# Patient Record
(demographics unavailable — no encounter records)

---

## 2018-09-21 NOTE — HP
PRIMARY CARE PROVIDER:  None.

 

CHIEF COMPLAINT:  Shortness of breath.

 

HISTORY OF PRESENT ILLNESS:  Ms. Robledo is a pleasant 21-year-old lady who was seen at [facility] on 09/21/2018.

 

She reports that she has no medical problems.  She denies any sick contacts.  She developed shortness
 of breath last night.  The shortness of breath was accompanied by cough.  The cough is productive of
 yellow sputum.  She felt feverish at home.  She also reports nausea at home.  She denies any vomitin
g or diarrhea.  She also reports pain over her left upper back, but is unable to characterize it furt
her.

 

REVIEW OF SYSTEMS:  All other systems reviewed and found to be negative.

 

PAST MEDICAL HISTORY:  None.

 

PAST SURGICAL HISTORY:  None.

 

SOCIAL HISTORY:  The patient denies alcohol use or recreational drug use.  She smokes half a pack of 
cigarettes a day.

 

FAMILY HISTORY:  Myocardial infarction in her father in his 50s.

 

ALLERGIES:  No known drug allergies.

 

CURRENT MEDICATIONS:  None.

 

PHYSICAL EXAMINATION:

GENERAL:  On examination, Ms. Robledo is awake and alert, not in acute distress.  She is obese.

VITAL SIGNS:  Blood pressure is 129/92, pulse 128, respiratory rate 17 and oxygen saturation 95% on r
oom air.  T-max in the emergency room was 101.3 degrees Fahrenheit.

EYES:  No scleral icterus.  No conjunctival pallor.

ENT:  Moist mucosal membranes, no oropharyngeal erythema or exudates.

NECK:  Supple, nontender, trachea is midline.

RESPIRATORY:  Accessory muscles of breathing are not active.  Chest wall movements are symmetric bila
terally.

LUNGS:  Examination reveals left-sided bronchial breathing.

CARDIOVASCULAR:  S1 and S2 are heard, tachycardic and regular.  Peripheral pulses palpable.  No carot
id bruit, no pericardial rub.

ABDOMEN:  Soft, nontender, bowel sounds are heard, no hepatomegaly, no splenomegaly.

NEUROLOGIC:  Cranial nerves II-XII are intact.  Deep tendon reflexes are 2+.

MUSCULOSKELETAL:  Power is 5/5 in all 4 extremities.

SKIN:  No rashes or subcutaneous nodules.

LYMPHATIC:  No cervical lymphadenopathy.

PSYCHIATRIC:  Normal mood, normal affect, patient is oriented to person, place, and time.

 

IMAGING DATA AND LABORATORY DATA:  Ms. Robledo's labs and investigations were reviewed.  I reviewed her 
electrocardiogram, which shows sinus tachycardia, no ST changes to suggest an acute coronary syndrome
.  I also reviewed her chest x-ray, which does not show any pulmonary infiltrates.  She also had CT a
ngiogram of the chest, which was a suboptimal study.  There was no evidence of a central filling defe
ct.  She also had patchy opacities in the left lung which may represent areas of infiltrates.  She al
so has a 6 mm nodular opacity in the right lower lobe.  Radiologist recommends follow up CT in 1 year
.  She has leukocytosis with 15,500 white cells, of which 76.7% are neutrophils, normal hemoglobin an
d normal platelet count, elevated D-dimer of 0.49, unremarkable comprehensive metabolic profile, norm
al troponin I, negative serum pregnancy test and nonreactive HIV 1 and 2 antigen and antibody.

 

ASSESSMENT AND PLAN:  Mr. Robledo is a pleasant 21-year-old lady who was seen at [facility] on 09/21/2018.  Her problem list includes:

1.  Sepsis:  Ms. Robledo is presenting with sepsis, most likely secondary to pneumonia.  She will be adm
itted to the hospital for further management including intravenous fluids and antibiotics.

2.  Pneumonia:  She will be treated with intravenous ceftriaxone and azithromycin.  I will follow blo
od cultures and adjust antibiotics as needed.

3.  Sinus tachycardia:  No evidence of pulmonary embolism, most likely secondary to sepsis.  We will 
continue to monitor vital signs.

4.  Tobacco use:  The patient has been counseled regarding tobacco cessation.  She declined nicotine 
replacement therapy.

 

LEVEL OF RISK:  High.

 

LEVEL OF COMPLEXITY:  High.

## 2018-09-21 NOTE — CT
CT ANGIOGRAM OF THE CHEST

9/21/18

 

HISTORY: 

Elevated D-dimer. Shortness of breath. Chest pain. Patient does smoke. 

 

COMPARISON:   

None. 

 

TECHNIQUE:  

CT angiogram of the chest is performed in the axial plane. Three dimensional reformatted images are s
ubmitted for interpretation.

 

FINDINGS:  

Patchy interstitial and alveolar opacities in the left upper lobe, medial left lower lobe. Adequate a
eration without evidence of consolidation in the right lung. Nonspecific linear opacity and nodule in
 the right lung apex. The nodule component measures 6 mm. Trachea and central bronchi are patent. No 
pleural effusion or pneumothorax.

 

No mediastinal mass, lymphadenopathy, or hematoma. Heart size is within normal limits. No pericardial
 effusion. The descending thoracic aorta and abdominal aorta have a normal caliber. No periaortic fat
 stranding. 

 

The visualized upper solid organs are unremarkable. 

 

Suboptimal evaluation of the pulmonary arterial system due to poor timing of contrast bolus and due t
o motion degradation. No evidence of a central filling defect to suggest thromboembolism. Evaluation 
of the lobar, segmental and subsegmental arteries is markedly limited. 

 

IMPRESSION:  

1.      Markedly limited evaluation due to timing of bolus as well as motion degradation. No obvious 
central PE. 

2.      Limited evaluation of the aorta. Overall caliber is within normal limits. No evidence of scott
aortic fat stranding. 

3.      Patchy opacities in the left lung which may represent areas of infiltrates. Continued surveil
andreea is recommended. 

4.      6 mm nodule opacity in the right upper lobe. Followup CT in one year. 

 

POS: [facility]

## 2018-09-21 NOTE — RAD
PORTABLE CHEST:

 

HISTORY: 

Cough.

 

FINDINGS: 

Evaluation of the lungs is limited due to poor inspiration and soft tissue attenuation.  The lungs ap
pear clear as visualized.  Cardiomediastinum unremarkable.

 

IMPRESSION: 

No acute process seen on portable exam.

 

POS: [facility]

## 2018-09-22 NOTE — PDOC.PN
- Subjective


Encounter Start Date: 09/22/18


Encounter Start Time: 07:20





Pt seen for followup re: pneumonia.  Reports cough, sputum.





- Objective


MAR Reviewed: Yes


Vital Signs & Weight: 


 Vital Signs (12 hours)











  Temp Pulse Resp BP BP Pulse Ox


 


 09/22/18 11:30  98.1 F  103 H  22 H  142/94 H   97


 


 09/22/18 09:09       95


 


 09/22/18 09:07   109 H  40 H   


 


 09/22/18 07:45  98.2 F  106 H  24 H  140/91 H   96


 


 09/22/18 04:00  98.4 F  110 H  22 H   169/98 H  94 L








 Weight











Weight                         327 lb 9.6 oz














I&O: 


 











 09/21/18 09/22/18 09/23/18





 06:59 06:59 06:59


 


Intake Total  1187 


 


Output Total  400 


 


Balance  787 











Result Diagrams: 


 09/22/18 05:42





 09/22/18 05:42


EKG Reviewed by me: Yes (Tele:  sinus tachycardia)





Phys Exam





- Physical Examination


morbid obesity


HEENT: moist MMs, sclera anicteric, oral pharynx no lesions, 2+ tonsils


Neck: no nodes, no JVD, supple, full ROM


Respiratory: no rales, no rhonchi, wheezing present, clear to auscultation 

bilateral


Cardiovascular: no rub


S1, S2, tachy, reg


Gastrointestinal: soft, non-tender, positive bowel sounds


distention


Neurological: moves all 4 limbs


Psychiatric: normal affect, A&O x 3





Dx/Plan


(1) Pneumonia


Code(s): J18.9 - PNEUMONIA, UNSPECIFIED ORGANISM   Status: Acute   Comment: 

Continue IV ceftriaxone, azithromycin and PRN DuoNebs.  Add Tessalon.   





(2) Sinus tachycardia


Code(s): R00.0 - TACHYCARDIA, UNSPECIFIED   Status: Acute   Comment: likely due 

to pneumonia/hypoxia.  No large PE on CTA.  Check TSH in AM.   





(3) Leucocytosis


Code(s): D72.829 - ELEVATED WHITE BLOOD CELL COUNT, UNSPECIFIED   Status: Acute

   Comment: Improving   





(4) Tobacco abuse


Code(s): Z72.0 - TOBACCO USE   Status: Chronic   Comment: pt counseled re: 

tobacco cessation   





- Plan


continue antibiotics, out of bed/ambulate, DVT proph w/lovenox





* .








Review of Systems





- Review of Systems


Constitutional: malaise.  negative: fever, chills, sweats, weakness


Respiratory: Cough, SOB with Excertion, Sputum.  negative: Dry, Shortness of 

Breath, Hemoptysis, Pleuritic Pain, Wheezing


Cardiovascular: negative: chest pain, palpitations, orthopnea, paroxysmal 

nocturnal dyspnea, edema, light headedness


Gastrointestinal: negative: Nausea, Vomiting, Abdominal Pain, Diarrhea, 

Constipation, Melena, Hematochezia


Genitourinary: negative: Dysuria, Frequency, Incontinence, Hematuria, Retention


Skin: negative: Rash, Lesions, Bradley, Bruising





- Medications/Allergies


Allergies/Adverse Reactions: 


 Allergies











Allergy/AdvReac Type Severity Reaction Status Date / Time


 


No Known Drug Allergies Allergy   Verified 09/21/18 20:39











Medications: 


 Current Medications





Acetaminophen (Tylenol)  650 mg PO Q4H PRN


   PRN Reason: Headache/Fever or Pain


   Last Admin: 09/21/18 18:35 Dose:  650 mg


Acetaminophen (Tylenol)  650 mg ME Q4H PRN


   PRN Reason: Headache/Fever or Pain


Albuterol/Ipratropium (Duoneb)  3 ml NEB Q4H PRN


   PRN Reason: SOB &/or Wheezing


   Last Admin: 09/22/18 09:07 Dose:  3 ml


Benzonatate (Tessalon)  100 mg PO TID PRN


   PRN Reason: Cough


   Last Admin: 09/22/18 08:30 Dose:  100 mg


Bisacodyl (Dulcolax)  10 mg PO DAILYPRN PRN


   PRN Reason: Constipation


Diphenhydramine HCl (Benadryl)  50 mg PO HSPRN PRN


   PRN Reason: Itching & Insomnia


   Last Admin: 09/21/18 22:20 Dose:  50 mg


Enoxaparin Sodium (Lovenox)  40 mg SC 0900 [facility]


   Last Admin: 09/22/18 08:31 Dose:  40 mg


Azithromycin 500 mg/ Sodium (Chloride)  250 mls @ 250 mls/hr IVPB Q24HR [facility]


Ceftriaxone Sodium 1 gm/ (Sodium Chloride)  100 mls @ 200 mls/hr IVPB Q24HR [facility]


Sodium Chloride (Normal Saline 0.9%)  1,000 mls @ 100 mls/hr IV .Q10H [facility]


   Last Admin: 09/22/18 06:47 Dose:  1,000 mls


Sodium Chloride (Flush - Normal Saline)  10 ml IVF Q12HR [facility]


   Last Admin: 09/22/18 08:30 Dose:  Not Given


Sodium Chloride (Flush - Normal Saline)  10 ml IVF PRN PRN


   PRN Reason: Saline Flush

## 2018-09-23 NOTE — DIS
DATE OF ADMISSION:  09/21/2018

 

DATE OF DISCHARGE:  Patient left against medical advice on 09/23/2018.

 

PRIMARY CARE PROVIDER:  None.

 

DISCHARGE DIAGNOSES:

1.  Sepsis.

2.  Pneumonia.

 

HOSPITAL COURSE:  Ms. Robledo is a pleasant, 21-year-old lady, who was admitted to [facility] on 09/21/2018 for sepsis secondary to pneumonia.  Please refer to my history and physica
l note dated 09/21/2018 for further details.  She was treated with intravenous antibiotics and bronch
odilators.  She was showing clinical improvement, but was still tachypneic on 09/23/2018.  She did no
t wish to stay in the hospital.  I discussed with her the need for at least one more day worth of hos
pitalization.  She wished to leave against medical advice.  I discussed the risks of leaving against 
medical advice including worsening of pneumonia and even potentially death.  She made an informed dec
ision to leave against medical advice.

## 2018-09-23 NOTE — PDOC.PN
- Subjective


Encounter Start Date: 09/23/18


Encounter Start Time: 07:20





Pt seen for followup re:  pneumonia.  Feels better.  No nausea or vomiting.  No 

fevers.  Cough better, still has sputum.





- Objective


MAR Reviewed: Yes


Vital Signs & Weight: 


 Vital Signs (12 hours)











  Temp Pulse Resp BP BP Pulse Ox


 


 09/23/18 13:10   95  18   


 


 09/23/18 11:35  98.6 F  98  32 H   135/79  98


 


 09/23/18 08:05       100


 


 09/23/18 08:01   99  28 H   


 


 09/23/18 07:20  98.5 F  102 H  24 H  130/78   96








 Weight











Weight                         331 lb














I&O: 


 











 09/22/18 09/23/18 09/24/18





 06:59 06:59 06:59


 


Intake Total 1187 4090 


 


Output Total 400 1450 


 


Balance 787 2640 











Result Diagrams: 


 09/23/18 05:44





 09/23/18 05:44


EKG Reviewed by me: Yes (Tele:  NSR)





Phys Exam





- Physical Examination


Morbid obesity


HEENT: moist MMs, sclera anicteric, oral pharynx no lesions, 2+ tonsils


Neck: no nodes, no JVD, supple, full ROM


Bibasal crackles


Cardiovascular: RRR, no rub


S1, S2


Gastrointestinal: soft, non-tender, positive bowel sounds


distended


Neurological: moves all 4 limbs


Psychiatric: normal affect, A&O x 3





Dx/Plan


(1) Pneumonia


Code(s): J18.9 - PNEUMONIA, UNSPECIFIED ORGANISM   Status: Acute   Comment: On 

IV ceftriaxone, azithromycin and PRN DuoNebs, will continue.   





(2) Tobacco abuse


Code(s): Z72.0 - TOBACCO USE   Status: Chronic   Comment: pt counseled re: 

tobacco cessation   





(3) Leucocytosis


Code(s): D72.829 - ELEVATED WHITE BLOOD CELL COUNT, UNSPECIFIED   Status: 

Resolved   





(4) Sinus tachycardia


Code(s): R00.0 - TACHYCARDIA, UNSPECIFIED   Status: Resolved   Comment: TSH 

normal   





- Plan


continue antibiotics, out of bed/ambulate





* .








Review of Systems





- Review of Systems


Constitutional: negative: fever, chills, sweats, weakness, malaise


Respiratory: Cough, Sputum.  negative: Dry, Shortness of Breath, Hemoptysis, 

SOB with Excertion, Pleuritic Pain, Wheezing


Cardiovascular: negative: chest pain, palpitations, orthopnea, paroxysmal 

nocturnal dyspnea, edema, light headedness


Gastrointestinal: negative: Nausea, Vomiting, Abdominal Pain, Diarrhea, 

Constipation, Melena, Hematochezia


Skin: negative: Rash, Lesions, Bradley, Bruising


Neurological: negative: Weakness, Numbness, Incoordination, Change in Speech, 

Confusion, Seizures





- Medications/Allergies


Allergies/Adverse Reactions: 


 Allergies











Allergy/AdvReac Type Severity Reaction Status Date / Time


 


No Known Drug Allergies Allergy   Verified 09/21/18 20:39

## 2019-01-05 NOTE — CT
PRELIMINARY REPORT/[facility]/EMERGENTY AFTER-HOURS PROCEDURE 

 

CT Abdomen and Pelvis Without Contrast

 

EXAM DATE/TIME:

1/5/2019 5:17 AM

 

CLINICAL HISTORY:

21 years old, female; Pain; Abdominal pain; Generalized; Patient HX: F 21 presents to ed with rlq abd
 pain. PT states that she had a uti 3 days ago (increased urination, sharp pains, denies vaginal d/c)
. No HX of appendicitis. Lmp 1 year ago. Denies appetite changes. Pmh of kidney stones.

Reports amoxil allergy.

 

TECHNIQUE:

Axial computed tomography images of the abdomen and pelvis without contrast.

Coronal reformatted images were created and reviewed.

 

COMPARISON:

No relevant prior studies available.

 

FINDINGS:

Lower thorax: No acute findings.

 

ABDOMEN:

Liver: Normal. No mass.

Gallbladder and bile ducts: Normal. No calcified stones. No ductal dilation.

Pancreas: Normal. No ductal dilation.

Spleen: Normal. No splenomegaly.

Adrenals: Normal. No mass.

Kidneys and ureters: Mild right obstructive uropathy, secondary to a 4 x 3 mm calculus within the int
ernal bladder orifice of the distal right ureter seen on image 100 of series 2. Multiple small nonobs
tructing calculi are additionally present in both kidneys.

Stomach and bowel: Normal. No obstruction. No mucosal thickening.

Appendix: No evidence of appendicitis.

 

PELVIS:

Bladder: Unremarkable as visualized.

Reproductive: Unremarkable as visualized.

 

ABDOMEN and PELVIS:

Intraperitoneal space: Normal. No free air. No significant fluid collection.

Bones/joints: No acute fracture. No dislocation.

Soft tissues: Unremarkable.

Vasculature: Normal. No abdominal aortic aneurysm.

Lymph nodes: Normal. No enlarged lymph nodes.

 

IMPRESSION:

1. Mild right obstructive uropathy, secondary to a 4 x 3 mm calculus within the internal bladder orif
ice of the distal right ureter, seen on image 100 of series 2.

2. Multiple small nonobstructing calculi are additionally present in both kidneys. The remainder of t
he abdomen pelvis is normal.

 

Thank you for allowing us to participate in the care of your patient.

Dictated and Authenticated by: Frank Sarkar MD

01/05/2019 7:36 AM Central Time ([place] & [place])

 

 

FINAL REPORT 

CT ABDOMEN AND PELVIS WITHOUT CONTRAST:

 

Date:  01/05/19 

 

HISTORY:  

Abdominal pain. Right lower quadrant pain. Urinary tract infection. 

 

COMPARISON:  

None. 

 

FINDINGS/IMPRESSION: 

Findings and impression are concordant with the preliminary report by Al. The calculus size may mor
e likely measure 2.0 x 2.0 mm. 

 

 

POS: [facility]